# Patient Record
Sex: MALE | Race: BLACK OR AFRICAN AMERICAN
[De-identification: names, ages, dates, MRNs, and addresses within clinical notes are randomized per-mention and may not be internally consistent; named-entity substitution may affect disease eponyms.]

---

## 2020-10-14 ENCOUNTER — HOSPITAL ENCOUNTER (EMERGENCY)
Dept: HOSPITAL 61 - ER | Age: 35
Discharge: HOME | End: 2020-10-14
Payer: COMMERCIAL

## 2020-10-14 VITALS — WEIGHT: 300.71 LBS | HEIGHT: 74 IN | BODY MASS INDEX: 38.59 KG/M2

## 2020-10-14 VITALS — SYSTOLIC BLOOD PRESSURE: 121 MMHG | DIASTOLIC BLOOD PRESSURE: 62 MMHG

## 2020-10-14 DIAGNOSIS — R09.81: ICD-10-CM

## 2020-10-14 DIAGNOSIS — R50.9: ICD-10-CM

## 2020-10-14 DIAGNOSIS — U07.1: Primary | ICD-10-CM

## 2020-10-14 DIAGNOSIS — R05: ICD-10-CM

## 2020-10-14 PROCEDURE — 71045 X-RAY EXAM CHEST 1 VIEW: CPT

## 2020-10-14 PROCEDURE — 93005 ELECTROCARDIOGRAM TRACING: CPT

## 2020-10-14 PROCEDURE — 99285 EMERGENCY DEPT VISIT HI MDM: CPT

## 2020-10-14 NOTE — PHYS DOC
Past Medical History


Past Medical History:  No Pertinent History





General Adult


EDM:


Chief Complaint:  FEVER





HPI:


HPI:


Reportedly healthy 35-year-old male who presents for evaluation of 2 days of URI

symptoms.  He reports nonproductive cough, nasal congestion, fever, chills, and 

diffuse myalgias.  No chest pain, but has occasional dyspnea while supine but 

not always.  Not currently dyspneic.  No known close contact with COVID-19 

positive individuals.  No aggravating or alleviating factors.  No headache, neck

pain or stiffness, focal weakness or paresthesia, chest pain, abdominal pain, 

nausea,, diarrhea, dysuria, new onset rash.





Review of Systems:


Review of Systems:


Gen: Reports fever, chills. 


Eyes: No blurred vision, diplopia. 


ENT: No sore throat.  Reports nasal congestion.


CV: No CP, palpitations. 


Resp. No SOB.  Reports cough.


GI: No abd pain, N/V.


: No dysuria, hematuria. 


Neuro: No HA, dizziness, weakness.


MSK: No back pain.  Reports myalgias.


Skin: No acute rash or lesion.





Heart Score:


Risk Factors:


Risk Factors:  DM, Current or recent (<one month) smoker, HTN, HLP, family his

tory of CAD, obesity.


Risk Scores:


Score 0 - 3:  2.5% MACE over next 6 weeks - Discharge Home


Score 4 - 6:  20.3% MACE over next 6 weeks - Admit for Clinical Observation


Score 7 - 10:  72.7% MACE over next 6 weeks - Early Invasive Strategies





Physical Exam:


PE:


Gen: NAD. Well nourished. 


Head: NC/AT. 


Eyes: No scleral icterus. No conjunctival injection. 


ENT: MMM. Posterior OP clear. 


Neck: Supple. NT.  No meningismus.


CV: RRR.  No murmur or rub.  Peripheral pulses intact. 


Resp: CTAB.  Slightly diminished.  No increased work of breathing.


Abd: Soft. NT. ND. 


MSK: No peripheral cyanosis. No edema.  No calf tenderness or asymmetry.


Neuro: A&Ox3. Strength & sensation grossly intact throughout. 


Skin. Warm. Dry.  


Psych: Appropriate mood & affect.





Current Patient Data:


Vital Signs:





                                   Vital Signs








  Date Time  Temp Pulse Resp B/P (MAP) Pulse Ox O2 Delivery O2 Flow Rate FiO2


 


10/14/20 14:37 102.7 106 20 121/63 (82) 95 Room Air  





 102.7       











EKG:


EKG:


EKG performed at 1710.  Sinus tachycardia.  Heart rate 114.  Normal intervals.  

No STEMI.  Interpreted by me.





Radiology/Procedures:


Radiology/Procedures:


[]





Course & Med Decision Making:


Course & Med Decision Making


Pertinent Labs and Imaging studies reviewed. (See chart for details)





In summary, healthy 35-year-old male who presents for evaluation of URI symptoms

 including nonproductive cough, nasal congestion, fever, chills, loss of taste 

and smell.  Pulse oximetry is 96% on room air.  Febrile at 102 upon arrival.  

Received Tylenol for fever.  EKG shows no acute injury pattern.  Chest x-ray 

without acute infiltrative process.  The patient is well-appearing and nontoxic.

  He was swabbed for COVID-19, results pending at time of disposition.  Will be 

discharged home with symptomatic treatment.  Outpatient PMD follow-up.  Return 

precautions given.





Dragon Disclaimer:


Dragon Disclaimer:


This electronic medical record was generated, in whole or in part, using a voice

 recognition dictation system.





Departure


Departure


Impression:  


   Primary Impression:  


   Suspected 2019 novel coronavirus infection


Disposition:  01 DC HOME SELF CARE/HOMELESS


Condition:  STABLE


Referrals:  


NO PCP (PCP)


Patient Instructions:  Viral Syndrome





Additional Instructions:  


Your are suspected to have COVID-19. 


You were tested for COVID-19, but your results are still pending. 


Take tylenol (acetaminophen) or motrin (ibuprofen) as needed for pain/fever. 


Take the prescribed steroid and cough medication as well. 


Return to the ED if you develop new or worsening symptoms.


Scripts


Benzonatate (TESSALON PERLE) 100 Mg Capsule


1 CAP PO TID PRN for COUGH, #21 CAP


   Prov: LE,DARCIE H DO         10/14/20 


Prednisone (PREDNISONE) 50 Mg Tablet


1 TAB PO DAILY, #5 TAB


   Prov: LE,DARCIE H DO         10/14/20











LE,DARCIE H DO                    Oct 14, 2020 17:07

## 2020-10-14 NOTE — RAD
CHEST AP ONLY

 

History: Reason: Cough / Spl. Instructions:  / History: 

 

Comparison: None.

 

Findings:

Patchy right mid and bibasilar opacities. No pleural effusion. No 

pneumothorax. Normal heart size.

 

Impression: 

1.  Patchy bilateral opacities, may represent atelectasis or 

consolidations.

 

Electronically signed by: Garret Waters DO (10/14/2020 5:57 PM) [facility]

## 2020-10-15 NOTE — EKG
[facility]

               [address] [place] [address]

Test Date:    2020-10-14               Test Time:    17:10:41

Pat Name:     COY BOB        Department:   

Patient ID:   [id number]           Room:          

Gender:       M                        Technician:   

:          [id number]               Requested By: DARCIE CARDONA

Order Number: [id number]           Reading MD:     

                                 Measurements

Intervals                              Axis          

Rate:         114                      P:            26

OK:           136                      QRS:          5

QRSD:         78                       T:            2

QT:           278                                    

QTc:          386                                    

                           Interpretive Statements

SINUS TACHYCARDIA

OTHERWISE NORMAL ECG

RI6.02

No previous ECG available for comparison

## 2020-10-15 NOTE — NUR
IP: Informed pt of positive COVID test and need to quarantine for 14 days. Pt verbalized 
understanding.